# Patient Record
Sex: MALE | NOT HISPANIC OR LATINO | Employment: FULL TIME | URBAN - METROPOLITAN AREA
[De-identification: names, ages, dates, MRNs, and addresses within clinical notes are randomized per-mention and may not be internally consistent; named-entity substitution may affect disease eponyms.]

---

## 2022-08-03 ENCOUNTER — OFFICE VISIT (OUTPATIENT)
Dept: FAMILY MEDICINE | Facility: CLINIC | Age: 37
End: 2022-08-03
Payer: COMMERCIAL

## 2022-08-03 VITALS
OXYGEN SATURATION: 97 % | BODY MASS INDEX: 29.03 KG/M2 | HEART RATE: 94 BPM | HEIGHT: 71 IN | WEIGHT: 207.4 LBS | DIASTOLIC BLOOD PRESSURE: 84 MMHG | SYSTOLIC BLOOD PRESSURE: 138 MMHG | TEMPERATURE: 98.9 F

## 2022-08-03 DIAGNOSIS — F10.10 ALCOHOL ABUSE: ICD-10-CM

## 2022-08-03 DIAGNOSIS — F32.A DEPRESSION, UNSPECIFIED DEPRESSION TYPE: ICD-10-CM

## 2022-08-03 DIAGNOSIS — Z23 HIGH PRIORITY FOR 2019-NCOV VACCINE: ICD-10-CM

## 2022-08-03 DIAGNOSIS — Z86.718 PERSONAL HISTORY OF DVT (DEEP VEIN THROMBOSIS): ICD-10-CM

## 2022-08-03 DIAGNOSIS — F41.9 ANXIETY: ICD-10-CM

## 2022-08-03 DIAGNOSIS — F17.200 SMOKER: ICD-10-CM

## 2022-08-03 LAB
ALBUMIN SERPL-MCNC: 4.3 G/DL (ref 3.4–5)
ALP SERPL-CCNC: 57 U/L (ref 40–150)
ALT SERPL W P-5'-P-CCNC: 33 U/L (ref 0–70)
AST SERPL W P-5'-P-CCNC: 15 U/L (ref 0–45)
BILIRUB DIRECT SERPL-MCNC: 0.2 MG/DL (ref 0–0.2)
BILIRUB SERPL-MCNC: 1 MG/DL (ref 0.2–1.3)
PROT SERPL-MCNC: 7.5 G/DL (ref 6.8–8.8)

## 2022-08-03 PROCEDURE — 0064A COVID-19,PF,MODERNA (18+ YRS BOOSTER .25ML): CPT | Performed by: FAMILY MEDICINE

## 2022-08-03 PROCEDURE — 99203 OFFICE O/P NEW LOW 30 MIN: CPT | Mod: 25 | Performed by: FAMILY MEDICINE

## 2022-08-03 PROCEDURE — 91306 COVID-19,PF,MODERNA (18+ YRS BOOSTER .25ML): CPT | Performed by: FAMILY MEDICINE

## 2022-08-03 PROCEDURE — 36415 COLL VENOUS BLD VENIPUNCTURE: CPT | Performed by: FAMILY MEDICINE

## 2022-08-03 PROCEDURE — 80076 HEPATIC FUNCTION PANEL: CPT | Performed by: FAMILY MEDICINE

## 2022-08-03 ASSESSMENT — ANXIETY QUESTIONNAIRES
GAD7 TOTAL SCORE: 12
GAD7 TOTAL SCORE: 12
1. FEELING NERVOUS, ANXIOUS, OR ON EDGE: NEARLY EVERY DAY
IF YOU CHECKED OFF ANY PROBLEMS ON THIS QUESTIONNAIRE, HOW DIFFICULT HAVE THESE PROBLEMS MADE IT FOR YOU TO DO YOUR WORK, TAKE CARE OF THINGS AT HOME, OR GET ALONG WITH OTHER PEOPLE: SOMEWHAT DIFFICULT
6. BECOMING EASILY ANNOYED OR IRRITABLE: SEVERAL DAYS
2. NOT BEING ABLE TO STOP OR CONTROL WORRYING: MORE THAN HALF THE DAYS
5. BEING SO RESTLESS THAT IT IS HARD TO SIT STILL: SEVERAL DAYS
3. WORRYING TOO MUCH ABOUT DIFFERENT THINGS: SEVERAL DAYS
7. FEELING AFRAID AS IF SOMETHING AWFUL MIGHT HAPPEN: SEVERAL DAYS

## 2022-08-03 ASSESSMENT — PATIENT HEALTH QUESTIONNAIRE - PHQ9
5. POOR APPETITE OR OVEREATING: NEARLY EVERY DAY
SUM OF ALL RESPONSES TO PHQ QUESTIONS 1-9: 19

## 2022-08-03 ASSESSMENT — PAIN SCALES - GENERAL: PAINLEVEL: NO PAIN (0)

## 2022-08-03 NOTE — PROGRESS NOTES
"  Assessment & Plan     Depression, unspecified depression type      Anxiety      Alcohol abuse    - Adult Mental Health  Referral; Future  - Hepatic panel (Albumin, ALT, AST, Bili, Alk Phos, TP); Future  Discussed needs to completely off alcohol  Referral done to chem dep  He will follow up after he quits and we can start meds  Personal history of DVT (deep vein thrombosis)  In 2019 after surgery     Smoker  Discussed quitting    High priority for 2019-nCoV vaccine  Discussed   - COVID-19,PF,MODERNA (18+ Yrs BOOSTER .25mL)  Nicotine/Tobacco Cessation:  He reports that he has been smoking. He started smoking about 19 years ago. He has been smoking about 0.50 packs per day. He has never used smokeless tobacco.  Nicotine/Tobacco Cessation Plan:   Information offered: Patient not interested at this time      BMI:   Estimated body mass index is 28.81 kg/m  as calculated from the following:    Height as of this encounter: 1.807 m (5' 11.14\").    Weight as of this encounter: 94.1 kg (207 lb 6.4 oz).   Weight management plan: Exercise        Return in about 1 month (around 9/3/2022) for recheck/ sooner if worse or New symptoms.   Referral done  Follow up after he quits alcohol    Cristiane Sotelo MD  [facility] is a 37 year old, presenting for the following health issues:  No chief complaint on file.    HPI     Abnormal Mood Symptoms  Onset/Duration: unsure  Description: Started seeing a therapist in February or March of this year.   Depression (if yes, do PHQ-9): YES  Anxiety (if yes, do IRMA-7): YES  Accompanying Signs & Symptoms:  Still participating in activities that you used to enjoy: YES, only if invited   Fatigue: YES, but maybe related to other issue  Irritability: YES  Difficulty concentrating: No  Changes in appetite: YES  Problems with sleep: YES  Heart racing/beating fast: No  Abnormally elevated, expansive, or irritable mood: No  Persistently increased " "activity or energy: No  Thoughts of hurting yourself or others: No  History:  Recent stress or major life event: YES- wife with borderline personality disorder   Prior depression or anxiety: None  Family history of depression or anxiety: No  Alcohol/drug use: YES  Difficulty sleeping: YES  Precipitating or alleviating factors: None  Therapies tried and outcome: none and individual therapy  Significant other has Personality disorder  So having a hard time with this  Work is Good  Likes his work but Right now dos not feel like Going  Pt does drink a fair amount of alcohol equivalent to 5 drinks of whiskey daily  He says he gets into arguments with his girlfriend about this  He is ready to quit  No suicidal ideation or thoughts      Review of Systems   Rest of the ROS is Negative except see above and Problem list [stable]        Objective    /84   Pulse 94   Temp 98.9  F (37.2  C) (Temporal)   Ht 1.807 m (5' 11.14\")   Wt 94.1 kg (207 lb 6.4 oz)   SpO2 97%   BMI 28.81 kg/m    Body mass index is 28.81 kg/m .  Physical Exam   GENERAL: healthy, alert and no distress  PSYCH: anxious    Pending             .  ..  "

## 2022-08-03 NOTE — COMMUNITY RESOURCES LIST (ENGLISH)
08/03/2022   [facility] - Outpatient Clinics  N/A  For questions about this resource list or additional care needs, please contact your primary care clinic or care manager.  Phone: [phone]   Email: N/A   Address: [address] [place] [address]   Hours: N/A        Hotlines and Helplines       Hotline - Crisis help  1  [facility] - Crisis Helpline Distance: 2.29 miles      COVID-19 Status: Phone/Virtual   [address] [place] [address]  Language: Danish, Luxembourgish, English, Gujarati, Rosaline, Kiswahili, Danish, Mongolian, Urdu, Sinhala  Hours: Mon - Sun Open 24 Hours  Fees: Free   Phone: (273) 845-8520 Email: info@[email or web address].org Website: [email or web address]://www.[email or web address].[email or web address]/     2  [employer] [facility]. - 24-Hour Helpline Distance: 4.54 miles      COVID-19 Status: Regular Operations   [address] [place] [address]  Language: Danish, English, Hmong, Citizen of [place], Slovenian  Hours: Mon - Sun Open 24 Hours   Phone: (204) 677-9721 Email: brlcopyr4z@[employer] Website: http://[employer]          Mental Health       Individual counseling  [address] [place] [address] Distance: 0.22 miles      COVID-19 Status: Regular Operations, COVID-19 Status: Phone/Virtual   [address] [place] [address]  Language: English  Hours: Mon - Thu 7:00 AM - 6:00 PM , Fri 7:00 AM - 5:00 PM  Fees: Insurance, Self Pay   Phone: (264) 151-8917 Email: [email or web address].[email or web address] Website: [email or web address]://www.[email or web address]/[facility]/siuiiarv-shhskvm-[email or web address] Distance: 0.97 miles      COVID-19 Status: Unable to Verify   [address] [address]  Language: English  Hours: Mon - Fri 6:00 AM - 9:00 PM  Fees: Insurance, Self Pay, Sliding Fee   Phone: (234) 243-5712 Email: info@[employer] Website: http://www.[employer]     Mental health crisis care  [address] [place] [address]  Crisis Services Distance: 4.62 miles      COVID-19 Status: Regular Operations, COVID-19 Status: Phone/Virtual   [address] [place] [address]  Language: English  Hours: Mon - Sun Open 24 Hours  Fees: Insurance, Self Pay   Phone: (187) 674-7963 Website: [email or web address]     6  [facility] Acute Psychiatry Services Distance: 7.92 miles      COVID-19 Status: Regular Operations   [address] [place] [address]  Language: English  Hours: Mon - Sun Open 24 Hours  Fees: Insurance, Self Pay, Sliding Fee   Phone: (211) 483-9092 Website: https://www.[facility].org/specialty/psychiatry/acute-psychiatry-services/     Mental health support group  7  [facility] Distance: 1.78 miles      COVID-19 Status: Phone/Virtual   [address] [place] [address]  Language: English  Hours: Mon - Fri 9:00 AM - 3:00 PM  Fees: Free   Phone: (356) 964-6012 Website: http://www.[employer].org/     8  [facility] ([facility] Distance: 7.43 miles      COVID-19 Status: Regular Operations, COVID-19 Status: Phone/Virtual   [address] [place] [address]  Language: English, Pashto, Flo  Hours: Mon - Fri 9:00 AM - 5:00 PM  Fees: Free   Phone: (883) 130-5095 Email: letha@[employer] Website: https://www.[employer]).com/[employer].com.org/          Important Numbers & Websites       Emergency Services   911  City Services   311  Poison Control   (643) 101-9985  Suicide Prevention Lifeline   (430) 919-5073 (TALK)  Child Abuse Hotline   (424) 380-5527 (4-A-Child)  Sexual Assault Hotline   (290) 588-4559 (HOPE)  National Runaway Safeline   (839) 937-6414 (RUNAWAY)  All-Options Talkline   (840) 328-2323  Substance Abuse Referral   (688) 806-8896 (HELP)     Patient/Caregiver provided printed discharge information.

## 2022-08-03 NOTE — LETTER
August 4, 2022    Henry Garcia  [address]  [place] [address]           Dear ,    We are writing to inform you of your test results.    Your liver test is normal.       Resulted Orders   Hepatic panel (Albumin, ALT, AST, Bili, Alk Phos, TP)   Result Value Ref Range    Bilirubin Total 1.0 0.2 - 1.3 mg/dL    Bilirubin Direct 0.2 0.0 - 0.2 mg/dL    Protein Total 7.5 6.8 - 8.8 g/dL    Albumin 4.3 3.4 - 5.0 g/dL    Alkaline Phosphatase 57 40 - 150 U/L    AST 15 0 - 45 U/L    ALT 33 0 - 70 U/L       If you have any questions or concerns, please call the clinic at the number listed above.       Sincerely,      Cristiane Sotelo MD

## 2022-08-07 ENCOUNTER — MYC MEDICAL ADVICE (OUTPATIENT)
Dept: FAMILY MEDICINE | Facility: CLINIC | Age: 37
End: 2022-08-07

## 2022-08-24 ENCOUNTER — OFFICE VISIT (OUTPATIENT)
Dept: FAMILY MEDICINE | Facility: CLINIC | Age: 37
End: 2022-08-24
Payer: COMMERCIAL

## 2022-08-24 VITALS
WEIGHT: 210 LBS | SYSTOLIC BLOOD PRESSURE: 122 MMHG | BODY MASS INDEX: 29.17 KG/M2 | OXYGEN SATURATION: 100 % | TEMPERATURE: 97.6 F | HEART RATE: 60 BPM | DIASTOLIC BLOOD PRESSURE: 92 MMHG

## 2022-08-24 DIAGNOSIS — F32.A DEPRESSION, UNSPECIFIED DEPRESSION TYPE: Primary | ICD-10-CM

## 2022-08-24 DIAGNOSIS — F41.9 ANXIETY: ICD-10-CM

## 2022-08-24 DIAGNOSIS — F10.10 ALCOHOL ABUSE: ICD-10-CM

## 2022-08-24 PROCEDURE — 99214 OFFICE O/P EST MOD 30 MIN: CPT | Performed by: PHYSICIAN ASSISTANT

## 2022-08-24 RX ORDER — CITALOPRAM HYDROBROMIDE 20 MG/1
20 TABLET ORAL DAILY
Qty: 90 TABLET | Refills: 0 | Status: SHIPPED | OUTPATIENT
Start: 2022-08-24 | End: 2022-11-21

## 2022-08-24 ASSESSMENT — ANXIETY QUESTIONNAIRES
4. TROUBLE RELAXING: NEARLY EVERY DAY
3. WORRYING TOO MUCH ABOUT DIFFERENT THINGS: MORE THAN HALF THE DAYS
8. IF YOU CHECKED OFF ANY PROBLEMS, HOW DIFFICULT HAVE THESE MADE IT FOR YOU TO DO YOUR WORK, TAKE CARE OF THINGS AT HOME, OR GET ALONG WITH OTHER PEOPLE?: SOMEWHAT DIFFICULT
5. BEING SO RESTLESS THAT IT IS HARD TO SIT STILL: SEVERAL DAYS
6. BECOMING EASILY ANNOYED OR IRRITABLE: MORE THAN HALF THE DAYS
IF YOU CHECKED OFF ANY PROBLEMS ON THIS QUESTIONNAIRE, HOW DIFFICULT HAVE THESE PROBLEMS MADE IT FOR YOU TO DO YOUR WORK, TAKE CARE OF THINGS AT HOME, OR GET ALONG WITH OTHER PEOPLE: SOMEWHAT DIFFICULT
7. FEELING AFRAID AS IF SOMETHING AWFUL MIGHT HAPPEN: NOT AT ALL
GAD7 TOTAL SCORE: 11
1. FEELING NERVOUS, ANXIOUS, OR ON EDGE: SEVERAL DAYS
2. NOT BEING ABLE TO STOP OR CONTROL WORRYING: MORE THAN HALF THE DAYS
GAD7 TOTAL SCORE: 11
7. FEELING AFRAID AS IF SOMETHING AWFUL MIGHT HAPPEN: NOT AT ALL
GAD7 TOTAL SCORE: 11

## 2022-08-24 ASSESSMENT — PATIENT HEALTH QUESTIONNAIRE - PHQ9
SUM OF ALL RESPONSES TO PHQ QUESTIONS 1-9: 19
SUM OF ALL RESPONSES TO PHQ QUESTIONS 1-9: 19
10. IF YOU CHECKED OFF ANY PROBLEMS, HOW DIFFICULT HAVE THESE PROBLEMS MADE IT FOR YOU TO DO YOUR WORK, TAKE CARE OF THINGS AT HOME, OR GET ALONG WITH OTHER PEOPLE: VERY DIFFICULT

## 2022-08-24 ASSESSMENT — ENCOUNTER SYMPTOMS: NERVOUS/ANXIOUS: 1

## 2022-08-24 NOTE — LETTER
My Depression Action Plan  Name: Henry Garcia   Date of Birth 1985  Date: 8/24/2022    My doctor: Cristiane Sotelo   My clinic: [address]  [place] [address]  [phone]          GREEN    ZONE   Good Control    What it looks like:     Things are going generally well. You have normal ups and downs. You may even feel depressed from time to time, but bad moods usually last less than a day.   What you need to do:  1. Continue to care for yourself (see self care plan)  2. Check your depression survival kit and update it as needed  3. Follow your physician s recommendations including any medication.  4. Do not stop taking medication unless you consult with your physician first.           YELLOW         ZONE Getting Worse    What it looks like:     Depression is starting to interfere with your life.     It may be hard to get out of bed; you may be starting to isolate yourself from others.    Symptoms of depression are starting to last most all day and this has happened for several days.     You may have suicidal thoughts but they are not constant.   What you need to do:     1. Call your care team. Your response to treatment will improve if you keep your care team informed of your progress. Yellow periods are signs an adjustment may need to be made.     2. Continue your self-care.  Just get dressed and ready for the day.  Don't give yourself time to talk yourself out of it.    3. Talk to someone in your support network.    4. Open up your Depression Self-Care Plan/Wellness Kit.           RED    ZONE Medical Alert - Get Help    What it looks like:     Depression is seriously interfering with your life.     You may experience these or other symptoms: You can t get out of bed most days, can t work or engage in other necessary activities, you have trouble taking care of basic hygiene, or basic responsibilities, thoughts of suicide or death that will not go  away, self-injurious behavior.     What you need to do:  1. Call your care team and request a same-day appointment. If they are not available (weekends or after hours) call your local crisis line, emergency room or 911.          Depression Self-Care Plan / Wellness Kit    Many people find that medication and therapy are helpful treatments for managing depression. In addition, making small changes to your everyday life can help to boost your mood and improve your wellbeing. Below are some tips for you to consider. Be sure to talk with your medical provider and/or behavioral health consultant if your symptoms are worsening or not improving.     Sleep   Sleep hygiene  means all of the habits that support good, restful sleep. It includes maintaining a consistent bedtime and wake time, using your bedroom only for sleeping or sex, and keeping the bedroom dark and free of distractions like a computer, smartphone, or television.     Develop a Healthy Routine  Maintain good hygiene. Get out of bed in the morning, make your bed, brush your teeth, take a shower, and get dressed. Don t spend too much time viewing media that makes you feel stressed. Find time to relax each day.    Exercise  Get some form of exercise every day. This will help reduce pain and release endorphins, the  feel good  chemicals in your brain. It can be as simple as just going for a walk or doing some gardening, anything that will get you moving.      Diet  Strive to eat healthy foods, including fruits and vegetables. Drink plenty of water. Avoid excessive sugar, caffeine, alcohol, and other mood-altering substances.     Stay Connected with Others  Stay in touch with friends and family members.    Manage Your Mood  Try deep breathing, massage therapy, biofeedback, or meditation. Take part in fun activities when you can. Try to find something to smile about each day.     Psychotherapy  Be open to working with a therapist if your provider recommends it.      Medication  Be sure to take your medication as prescribed. Most anti-depressants need to be taken every day. It usually takes several weeks for medications to work. Not all medicines work for all people. It is important to follow-up with your provider to make sure you have a treatment plan that is working for you. Do not stop your medication abruptly without first discussing it with your provider.    Crisis Resources   These hotlines are for both adults and children. They and are open 24 hours a day, 7 days a week unless noted otherwise.      National Suicide Prevention Lifeline   988 or 9-693-309-WOFG (8724)      Crisis Text Line    www.crisistextline.org  Text HOME to 449746 from anywhere in the [place], anytime, about any type of crisis. A live, trained crisis counselor will receive the text and respond quickly.      Jamie Lifeline for LGBTQ Youth  A national crisis intervention and suicide lifeline for LGBTQ youth under 25. Provides a safe place to talk without judgement. Call 1-343.787.8259; text START to 489559 or visit www.thetrevorproject.org to talk to a trained counselor.      For [facility] crisis numbers, visit the [facility] website at:  https://[place].gov/dhs/people-we-serve/adults/health-care/mental-health/resources/crisis-contacts.jsp

## 2022-08-24 NOTE — PROGRESS NOTES
Assessment & Plan     Depression, unspecified depression type  Will start celexa, increased alcohol use is a coping mechanism for depression. Needs treatment. Discussed cutting back on alcohol.Cpntinue with counseling. Follow up in 4-6 weeks. Disucssed potential side effects.   - citalopram (CELEXA) 20 MG tablet; Take 1 tablet (20 mg) by mouth daily    Anxiety  - citalopram (CELEXA) 20 MG tablet; Take 1 tablet (20 mg) by mouth daily    Alcohol abuse               Depression Screening Follow Up    PHQ 8/24/2022   PHQ-9 Total Score 19   Q9: Thoughts of better off dead/self-harm past 2 weeks Not at all         Follow Up Actions Taken  Depression Action Plan reviewed with patient.         Return in about 5 weeks (around 9/28/2022) for Video/Virtual Visit, Mood check.    Sima Santoyo PA-C  [facility] [place] Figueroa is a 37 year old, presenting for the following health issues:  Depression, Anxiety, and Health Maintenance      Anxiety    History of Present Illness       Mental Health Follow-up:  Patient presents to follow-up on Depression & Anxiety.Patient's depression since last visit has been:  No change  The patient is not having other symptoms associated with depression.  Patient's anxiety since last visit has been:  No change  The patient is not having other symptoms associated with anxiety.  Any significant life events: No  Patient is not feeling anxious or having panic attacks.  Patient has no concerns about alcohol or drug use.     Today's PHQ-9         PHQ-9 Total Score: 19    PHQ-9 Q9 Thoughts of better off dead/self-harm past 2 weeks :   Not at all    How difficult have these problems made it for you to do your work, take care of things at home, or get along with other people: Very difficult  Today's IRMA-7 Score: 11     Struggled with mental health his whole life.   Seeing a therapist.     No suicidal thoughts.   Sleeps 4-5 hours per night.   Snores- doesn't get tired until  late and has to get up early.   Decreased interest in things he likes over the last year.   Work has been fine. New job over 1 year ago.     Whiskey- after dinner. 1-2 heavy handed.         Review of Systems   Psychiatric/Behavioral: The patient is nervous/anxious.             Objective    BP (!) 138/91 (BP Location: Right arm, Patient Position: Chair, Cuff Size: Adult Regular)   Pulse 60   Temp 97.6  F (36.4  C) (Oral)   Wt 95.3 kg (210 lb)   SpO2 100%   BMI 29.17 kg/m    Body mass index is 29.17 kg/m .  Physical Exam   GENERAL: healthy, alert and no distress  PSYCH: mentation appears normal, affect normal/bright                    .  ..

## 2022-08-28 ENCOUNTER — HEALTH MAINTENANCE LETTER (OUTPATIENT)
Age: 37
End: 2022-08-28

## 2022-09-22 ENCOUNTER — VIRTUAL VISIT (OUTPATIENT)
Dept: FAMILY MEDICINE | Facility: CLINIC | Age: 37
End: 2022-09-22
Payer: COMMERCIAL

## 2022-09-22 DIAGNOSIS — F10.10 ALCOHOL ABUSE: Primary | ICD-10-CM

## 2022-09-22 DIAGNOSIS — F32.4 MAJOR DEPRESSIVE DISORDER WITH SINGLE EPISODE, IN PARTIAL REMISSION (H): ICD-10-CM

## 2022-09-22 DIAGNOSIS — F41.9 ANXIETY: ICD-10-CM

## 2022-09-22 PROCEDURE — 99213 OFFICE O/P EST LOW 20 MIN: CPT | Mod: TEL | Performed by: PHYSICIAN ASSISTANT

## 2022-09-22 ASSESSMENT — PATIENT HEALTH QUESTIONNAIRE - PHQ9
SUM OF ALL RESPONSES TO PHQ QUESTIONS 1-9: 8
5. POOR APPETITE OR OVEREATING: NOT AT ALL

## 2022-09-22 ASSESSMENT — ANXIETY QUESTIONNAIRES
6. BECOMING EASILY ANNOYED OR IRRITABLE: MORE THAN HALF THE DAYS
5. BEING SO RESTLESS THAT IT IS HARD TO SIT STILL: NOT AT ALL
1. FEELING NERVOUS, ANXIOUS, OR ON EDGE: SEVERAL DAYS
GAD7 TOTAL SCORE: 4
IF YOU CHECKED OFF ANY PROBLEMS ON THIS QUESTIONNAIRE, HOW DIFFICULT HAVE THESE PROBLEMS MADE IT FOR YOU TO DO YOUR WORK, TAKE CARE OF THINGS AT HOME, OR GET ALONG WITH OTHER PEOPLE: SOMEWHAT DIFFICULT
2. NOT BEING ABLE TO STOP OR CONTROL WORRYING: SEVERAL DAYS
3. WORRYING TOO MUCH ABOUT DIFFERENT THINGS: NOT AT ALL
7. FEELING AFRAID AS IF SOMETHING AWFUL MIGHT HAPPEN: NOT AT ALL
GAD7 TOTAL SCORE: 4

## 2022-09-22 NOTE — PROGRESS NOTES
Henry is a 37 year old who is being evaluated via a billable telephone visit.      What phone number would you like to be contacted at? [phone]  How would you like to obtain your AVS? MyChart    Assessment & Plan     Alcohol abuse  Cutting back significantly.     Major depressive disorder with single episode, in partial remission (H)  Anxiety  Feeling better, PHQ-9 score improved. continue with therapist.                    No follow-ups on file.    PHILL Kendall [facility]    Cheyanne Figueroa is a 37 year old, presenting for the following health issues:  Depression, Anxiety, and Health Maintenance      HPI     Depression and Anxiety Follow-Up    How are you doing with your depression since your last visit? Improved     How are you doing with your anxiety since your last visit?  Improved     Are you having other symptoms that might be associated with depression or anxiety? No    Have you had a significant life event? OTHER: wife leaving     Do you have any concerns with your use of alcohol or other drugs? No    Social History     Tobacco Use     Smoking status: Current Every Day Smoker     Packs/day: 0.50     Start date: 6/15/2003     Smokeless tobacco: Never Used   Substance Use Topics     Alcohol use: Yes     Alcohol/week: 7.0 standard drinks     Types: 7 Shots of liquor per week     Comment: 7-10     Drug use: Never     PHQ 8/3/2022 8/24/2022   PHQ-9 Total Score 19 19   Q9: Thoughts of better off dead/self-harm past 2 weeks Not at all Not at all     IRMA-7 SCORE 8/3/2022 8/24/2022   Total Score - 11 (moderate anxiety)   Total Score 12 11         Suicide Assessment Five-step Evaluation and Treatment (SAFE-T)      How many servings of fruits and vegetables do you eat daily?  2-3    On average, how many sweetened beverages do you drink each day (Examples: soda, juice, sweet tea, etc.  Do NOT count diet or artificially sweetened beverages)?   1    How many days per week do you  exercise enough to make your heart beat faster? 4    How many minutes a day do you exercise enough to make your heart beat faster? 30 - 60  How many days per week do you miss taking your medication? 1    What makes it hard for you to take your medications?  remembering to take    Feeling better. Focus more, mood is better.   No depressive slumps. No longer thinking the worst.   No suicidal thoughts.   Alcohol intake, has cut it down by about half- now 1 per night.   No side effects.   Talked about it with his therapist.     Review of Systems   Constitutional, HEENT, cardiovascular, pulmonary, gi and gu systems are negative, except as otherwise noted.      Objective           Vitals:  No vitals were obtained today due to virtual visit.    Physical Exam   healthy, alert and no distress  PSYCH: Alert and oriented times 3; coherent speech, normal   rate and volume, able to articulate logical thoughts, able   to abstract reason, no tangential thoughts, no hallucinations   or delusions  His affect is normal  RESP: No cough, no audible wheezing, able to talk in full sentences  Remainder of exam unable to be completed due to telephone visits                Phone call duration: 7 minutes

## 2022-11-19 DIAGNOSIS — F41.9 ANXIETY: ICD-10-CM

## 2022-11-19 DIAGNOSIS — F32.A DEPRESSION, UNSPECIFIED DEPRESSION TYPE: ICD-10-CM

## 2022-11-21 RX ORDER — CITALOPRAM HYDROBROMIDE 20 MG/1
TABLET ORAL
Qty: 90 TABLET | Refills: 0 | Status: SHIPPED | OUTPATIENT
Start: 2022-11-21 | End: 2022-12-27

## 2022-12-27 ENCOUNTER — VIRTUAL VISIT (OUTPATIENT)
Dept: FAMILY MEDICINE | Facility: CLINIC | Age: 37
End: 2022-12-27
Payer: COMMERCIAL

## 2022-12-27 DIAGNOSIS — F41.9 ANXIETY: ICD-10-CM

## 2022-12-27 DIAGNOSIS — F32.A DEPRESSION, UNSPECIFIED DEPRESSION TYPE: ICD-10-CM

## 2022-12-27 PROCEDURE — 99214 OFFICE O/P EST MOD 30 MIN: CPT | Mod: GT | Performed by: NURSE PRACTITIONER

## 2022-12-27 PROCEDURE — 96127 BRIEF EMOTIONAL/BEHAV ASSMT: CPT | Mod: GT | Performed by: NURSE PRACTITIONER

## 2022-12-27 RX ORDER — CHOLECALCIFEROL (VITAMIN D3) 50 MCG
1 TABLET ORAL DAILY
Qty: 90 TABLET | Refills: 1 | Status: SHIPPED | OUTPATIENT
Start: 2022-12-27

## 2022-12-27 RX ORDER — CITALOPRAM HYDROBROMIDE 20 MG/1
20 TABLET ORAL DAILY
Qty: 90 TABLET | Refills: 1 | Status: SHIPPED | OUTPATIENT
Start: 2022-12-27 | End: 2023-05-11

## 2022-12-27 ASSESSMENT — ANXIETY QUESTIONNAIRES
GAD7 TOTAL SCORE: 4
5. BEING SO RESTLESS THAT IT IS HARD TO SIT STILL: NOT AT ALL
6. BECOMING EASILY ANNOYED OR IRRITABLE: SEVERAL DAYS
7. FEELING AFRAID AS IF SOMETHING AWFUL MIGHT HAPPEN: NOT AT ALL
3. WORRYING TOO MUCH ABOUT DIFFERENT THINGS: SEVERAL DAYS
2. NOT BEING ABLE TO STOP OR CONTROL WORRYING: SEVERAL DAYS
1. FEELING NERVOUS, ANXIOUS, OR ON EDGE: NOT AT ALL
GAD7 TOTAL SCORE: 4
8. IF YOU CHECKED OFF ANY PROBLEMS, HOW DIFFICULT HAVE THESE MADE IT FOR YOU TO DO YOUR WORK, TAKE CARE OF THINGS AT HOME, OR GET ALONG WITH OTHER PEOPLE?: NOT DIFFICULT AT ALL
GAD7 TOTAL SCORE: 4
4. TROUBLE RELAXING: SEVERAL DAYS
7. FEELING AFRAID AS IF SOMETHING AWFUL MIGHT HAPPEN: NOT AT ALL
IF YOU CHECKED OFF ANY PROBLEMS ON THIS QUESTIONNAIRE, HOW DIFFICULT HAVE THESE PROBLEMS MADE IT FOR YOU TO DO YOUR WORK, TAKE CARE OF THINGS AT HOME, OR GET ALONG WITH OTHER PEOPLE: NOT DIFFICULT AT ALL

## 2022-12-27 ASSESSMENT — PATIENT HEALTH QUESTIONNAIRE - PHQ9
SUM OF ALL RESPONSES TO PHQ QUESTIONS 1-9: 3
SUM OF ALL RESPONSES TO PHQ QUESTIONS 1-9: 3
10. IF YOU CHECKED OFF ANY PROBLEMS, HOW DIFFICULT HAVE THESE PROBLEMS MADE IT FOR YOU TO DO YOUR WORK, TAKE CARE OF THINGS AT HOME, OR GET ALONG WITH OTHER PEOPLE: SOMEWHAT DIFFICULT

## 2022-12-27 NOTE — PROGRESS NOTES
Henry is a 37 year old who is being evaluated via a billable telephone visit.      What phone number would you like to be contacted at? [phone]   How would you like to obtain your AVS? Slava  Distant Location (provider location):  Off-site    Assessment & Plan     Depression, unspecified depression type  Feels mood has slightly decreased. Has SAD, we will slightly increase celexa to 30 mg daily, he will start vit D. Information on light therapy given. Follow up with provider in 4 months, but sooner if he feels this increase in medication is not helping  - citalopram (CELEXA) 20 MG tablet; Take 1 tablet (20 mg) by mouth daily Take 30 mg (1.5 tab) daily  - vitamin D3 (CHOLECALCIFEROL) 50 mcg (2000 units) tablet; Take 1 tablet (50 mcg) by mouth daily    Anxiety  As above  - citalopram (CELEXA) 20 MG tablet; Take 1 tablet (20 mg) by mouth daily Take 30 mg (1.5 tab) daily  - vitamin D3 (CHOLECALCIFEROL) 50 mcg (2000 units) tablet; Take 1 tablet (50 mcg) by mouth daily        Return in about 4 months (around 4/27/2023) for Medication check, Phone or Video visit okay.    DEMETRIA Eden, NP-C  [facility]   Henry is a 37 year old accompanied by his s elf, presenting for the following health issues:  Recheck Medication      History of Present Illness       Mental Health Follow-up:  Patient presents to follow-up on Depression & Anxiety.Patient's depression since last visit has been:  Worse  The patient is not having other symptoms associated with depression.  Patient's anxiety since last visit has been:  Better  The patient is not having other symptoms associated with anxiety.  Any significant life events: No  Patient is not feeling anxious or having panic attacks.  Patient has no concerns about alcohol or drug use.    He eats 2-3 servings of fruits and vegetables daily.He consumes 1 sweetened beverage(s) daily.He exercises with enough effort to increase his heart rate 9 or  less minutes per day.  He exercises with enough effort to increase his heart rate 3 or less days per week.   He is taking medications regularly.    Today's PHQ-9         PHQ-9 Total Score: 3    PHQ-9 Q9 Thoughts of better off dead/self-harm past 2 weeks :   Not at all    How difficult have these problems made it for you to do your work, take care of things at home, or get along with other people: Somewhat difficult  Today's IRMA-7 Score: 4         celexa 20 mg going okay.  Is on his feet for 8 hr a day, that is his exercise for the day. Is not taking vit D.           Review of Systems   Constitutional, HEENT, cardiovascular, pulmonary, gi and gu systems are negative, except as otherwise noted.      Objective    Vitals - Patient Reported  Pain Score: No Pain (0)        Physical Exam   healthy, alert and no distress  PSYCH: Alert and oriented times 3; coherent speech, normal   rate and volume, able to articulate logical thoughts, able   to abstract reason, no tangential thoughts, no hallucinations   or delusions  His affect is normal  RESP: No cough, no audible wheezing, able to talk in full sentences  Remainder of exam unable to be completed due to telephone visits    Did not review labs today            Phone call duration: 5 minutes

## 2022-12-27 NOTE — PATIENT INSTRUCTIONS
Start bright light therapy using fluorescent bulbs with 10,000 lux, 16-31 inches from face with eyes open (do not stare at light) for 30 minutes a day. Do at the same time in the morning between/around 7-8 am for depression/anxiety.  Get light box that is designed to protect the eyes with features that filters out UV rays. Have baseline opthamology exam and annually thereafter while doing bright light therapy. Insurance does not cover this.     Also recommended to get outside even on cloudy days and walk for 30 minutes as this is also shown to help with anxiety and depression as the sunlight, even on overcast days can help improve mood.

## 2023-01-08 ENCOUNTER — HEALTH MAINTENANCE LETTER (OUTPATIENT)
Age: 38
End: 2023-01-08

## 2023-05-10 ENCOUNTER — TELEPHONE (OUTPATIENT)
Dept: FAMILY MEDICINE | Facility: CLINIC | Age: 38
End: 2023-05-10
Payer: COMMERCIAL

## 2023-05-10 DIAGNOSIS — F32.A DEPRESSION, UNSPECIFIED DEPRESSION TYPE: ICD-10-CM

## 2023-05-10 DIAGNOSIS — F41.9 ANXIETY: ICD-10-CM

## 2023-05-10 NOTE — TELEPHONE ENCOUNTER
Medication Question or Refill        What medication are you calling about (include dose and sig)?: citalopram (CELEXA) 20 MG tablet    Controlled Substance Agreement on file:   CSA -- Patient Level:    CSA: None found at the patient level.       Who prescribed the medication?: Veronica Verdin      Do you have any questions or concerns? Yes

## 2023-05-11 RX ORDER — CITALOPRAM HYDROBROMIDE 20 MG/1
30 TABLET ORAL DAILY
Qty: 45 TABLET | Refills: 0 | Status: SHIPPED | OUTPATIENT
Start: 2023-05-11 | End: 2023-06-16

## 2023-06-15 DIAGNOSIS — F41.9 ANXIETY: ICD-10-CM

## 2023-06-15 DIAGNOSIS — F32.A DEPRESSION, UNSPECIFIED DEPRESSION TYPE: ICD-10-CM

## 2023-06-15 NOTE — LETTER
July 5, 2023      Henry Garcia  [address] [address]      Hi Dr. Deepak Figueroa refilled your citalopram for 45 pills only. Please call [phone] to schedule an appointment to get more refills. We have been unable to reach you by phone.  Have a great day.    Siobhan Quintanilla ma              Sincerely,        Rashmi Snider MD

## 2023-06-16 RX ORDER — CITALOPRAM HYDROBROMIDE 20 MG/1
TABLET ORAL
Qty: 45 TABLET | Refills: 0 | Status: SHIPPED | OUTPATIENT
Start: 2023-06-16 | End: 2023-07-24

## 2023-06-19 ENCOUNTER — MYC MEDICAL ADVICE (OUTPATIENT)
Dept: FAMILY MEDICINE | Facility: CLINIC | Age: 38
End: 2023-06-19
Payer: COMMERCIAL

## 2023-06-19 ASSESSMENT — PATIENT HEALTH QUESTIONNAIRE - PHQ9
SUM OF ALL RESPONSES TO PHQ QUESTIONS 1-9: 4
10. IF YOU CHECKED OFF ANY PROBLEMS, HOW DIFFICULT HAVE THESE PROBLEMS MADE IT FOR YOU TO DO YOUR WORK, TAKE CARE OF THINGS AT HOME, OR GET ALONG WITH OTHER PEOPLE: NOT DIFFICULT AT ALL
SUM OF ALL RESPONSES TO PHQ QUESTIONS 1-9: 4

## 2023-06-19 NOTE — TELEPHONE ENCOUNTER
Sent patient mychart message postponing one week       Destinee HERNANDES CMA ([facility])

## 2023-06-26 NOTE — TELEPHONE ENCOUNTER
Called patient and left message to call back to schedule appointment.       Destinee HERNANDES CMA ([facility])

## 2023-07-22 DIAGNOSIS — F32.A DEPRESSION, UNSPECIFIED DEPRESSION TYPE: ICD-10-CM

## 2023-07-22 DIAGNOSIS — F41.9 ANXIETY: ICD-10-CM

## 2023-07-24 RX ORDER — CITALOPRAM HYDROBROMIDE 20 MG/1
TABLET ORAL
Qty: 45 TABLET | Refills: 0 | Status: SHIPPED | OUTPATIENT
Start: 2023-07-24 | End: 2023-08-14

## 2023-08-14 ENCOUNTER — MYC REFILL (OUTPATIENT)
Dept: FAMILY MEDICINE | Facility: CLINIC | Age: 38
End: 2023-08-14
Payer: COMMERCIAL

## 2023-08-14 DIAGNOSIS — F41.9 ANXIETY: ICD-10-CM

## 2023-08-14 DIAGNOSIS — F32.A DEPRESSION, UNSPECIFIED DEPRESSION TYPE: ICD-10-CM

## 2023-08-15 RX ORDER — CITALOPRAM HYDROBROMIDE 20 MG/1
30 TABLET ORAL DAILY
Qty: 45 TABLET | Refills: 0 | Status: SHIPPED | OUTPATIENT
Start: 2023-08-15 | End: 2023-11-21

## 2023-08-21 NOTE — TELEPHONE ENCOUNTER
"Patient \"READ\" mychart on 08/19/2023.    Me   to Henry CARPENTER      8/15/23 11:38 AM  Dear Henry,         Your provider has sent a  meryl refill of citalopram (CELEXA) 20 MG tablet . You are due for an appointment for further refills.   Please contact the clinic or use [facility]ants to schedule an appointment for further refills.      Sincerely,      [facility]/JAYDEN        Last read by Henry Garcia at 10:45 PM on 8/19/202.    An Lauren, JASKARAN      "

## 2023-09-24 ENCOUNTER — HEALTH MAINTENANCE LETTER (OUTPATIENT)
Age: 38
End: 2023-09-24

## 2023-11-20 ASSESSMENT — ANXIETY QUESTIONNAIRES
GAD7 TOTAL SCORE: 3
2. NOT BEING ABLE TO STOP OR CONTROL WORRYING: NOT AT ALL
7. FEELING AFRAID AS IF SOMETHING AWFUL MIGHT HAPPEN: NOT AT ALL
3. WORRYING TOO MUCH ABOUT DIFFERENT THINGS: NOT AT ALL
4. TROUBLE RELAXING: NOT AT ALL
6. BECOMING EASILY ANNOYED OR IRRITABLE: SEVERAL DAYS
GAD7 TOTAL SCORE: 3
5. BEING SO RESTLESS THAT IT IS HARD TO SIT STILL: SEVERAL DAYS
IF YOU CHECKED OFF ANY PROBLEMS ON THIS QUESTIONNAIRE, HOW DIFFICULT HAVE THESE PROBLEMS MADE IT FOR YOU TO DO YOUR WORK, TAKE CARE OF THINGS AT HOME, OR GET ALONG WITH OTHER PEOPLE: NOT DIFFICULT AT ALL
1. FEELING NERVOUS, ANXIOUS, OR ON EDGE: SEVERAL DAYS

## 2023-11-20 ASSESSMENT — PATIENT HEALTH QUESTIONNAIRE - PHQ9
SUM OF ALL RESPONSES TO PHQ QUESTIONS 1-9: 2
SUM OF ALL RESPONSES TO PHQ QUESTIONS 1-9: 2
10. IF YOU CHECKED OFF ANY PROBLEMS, HOW DIFFICULT HAVE THESE PROBLEMS MADE IT FOR YOU TO DO YOUR WORK, TAKE CARE OF THINGS AT HOME, OR GET ALONG WITH OTHER PEOPLE: NOT DIFFICULT AT ALL

## 2023-11-21 ENCOUNTER — VIRTUAL VISIT (OUTPATIENT)
Dept: FAMILY MEDICINE | Facility: CLINIC | Age: 38
End: 2023-11-21
Payer: COMMERCIAL

## 2023-11-21 DIAGNOSIS — F41.9 ANXIETY: ICD-10-CM

## 2023-11-21 DIAGNOSIS — R03.0 ELEVATED BP WITHOUT DIAGNOSIS OF HYPERTENSION: ICD-10-CM

## 2023-11-21 DIAGNOSIS — F32.A DEPRESSION, UNSPECIFIED DEPRESSION TYPE: Primary | ICD-10-CM

## 2023-11-21 PROCEDURE — 99213 OFFICE O/P EST LOW 20 MIN: CPT | Mod: VID | Performed by: FAMILY MEDICINE

## 2023-11-21 RX ORDER — CITALOPRAM HYDROBROMIDE 20 MG/1
30 TABLET ORAL DAILY
Qty: 135 TABLET | Refills: 0 | Status: SHIPPED | OUTPATIENT
Start: 2023-11-21

## 2023-11-21 NOTE — PROGRESS NOTES
Henry is a 38 year old who is being evaluated via a billable video visit.      How would you like to obtain your AVS? MyChart  If the video visit is dropped, the invitation should be resent by: Text to cell phone: [phone]  Will anyone else be joining your video visit? No      Assessment & Plan     Depression, unspecified depression type  Medication helping with the symptoms, PHQ-9 score is 2.  Will refill medication and follow-up in 3 months  - citalopram (CELEXA) 20 MG tablet; Take 1.5 tablets (30 mg) by mouth daily    Anxiety  Medication helping with anxiety, IRMA-7 score is 3  - citalopram (CELEXA) 20 MG tablet; Take 1.5 tablets (30 mg) by mouth daily    Elevated BP without diagnosis of hypertension    -Had an elevated blood pressure at last office visit, he will check some readings at home and give an update; if they are persistently high will return for recheck    See Patient Instructions    Fredrick Malin MD  [facility]   Henry is a 38 year old, presenting for the following health issues:  Recheck Medication    Depression  He taking Celexa 20 mg   No side effects so far;  Did see a therapy    Elevated BP:   Had an elevated reading in clinic; will check some readings at home and update us      11/21/2023     4:26 PM   Additional Questions   Roomed by Cesilia OMER   Accompanied by self- video visit     Depression and Anxiety Follow-Up  How are you doing with your depression since your last visit? Feeling better  How are you doing with your anxiety since your last visit?  Improved .  Are you having other symptoms that might be associated with depression or anxiety? No  Have you had a significant life event? No   Do you have any concerns with your use of alcohol or other drugs? No    Social History     Tobacco Use    Smoking status: Every Day     Packs/day: .5     Types: Cigarettes     Start date: 6/15/2003    Smokeless tobacco: Never   Vaping Use    Vaping Use: Never  used   Substance Use Topics    Alcohol use: Yes     Alcohol/week: 7.0 standard drinks of alcohol     Types: 7 Shots of liquor per week     Comment: 7-10    Drug use: Never         12/27/2022     3:23 PM 6/19/2023     8:31 AM 11/20/2023     8:13 PM   PHQ   PHQ-9 Total Score 3 4 2   Q9: Thoughts of better off dead/self-harm past 2 weeks Not at all Not at all Not at all         9/22/2022    12:21 PM 12/27/2022     3:23 PM 11/20/2023     8:17 PM   IRMA-7 SCORE   Total Score  4 (minimal anxiety) 3 (minimal anxiety)   Total Score 4 4 3         11/20/2023     8:13 PM   Last PHQ-9   1.  Little interest or pleasure in doing things 0   2.  Feeling down, depressed, or hopeless 0   3.  Trouble falling or staying asleep, or sleeping too much 0   4.  Feeling tired or having little energy 1   5.  Poor appetite or overeating 0   6.  Feeling bad about yourself 0   7.  Trouble concentrating 0   8.  Moving slowly or restless 1   Q9: Thoughts of better off dead/self-harm past 2 weeks 0   PHQ-9 Total Score 2         11/20/2023     8:17 PM   IRMA-7    1. Feeling nervous, anxious, or on edge 1   2. Not being able to stop or control worrying 0   3. Worrying too much about different things 0   4. Trouble relaxing 0   5. Being so restless that it is hard to sit still 1   6. Becoming easily annoyed or irritable 1   7. Feeling afraid, as if something awful might happen 0   IRMA-7 Total Score 3   If you checked any problems, how difficult have they made it for you to do your work, take care of things at home, or get along with other people? Not difficult at all         History of Present Illness       Mental Health Follow-up:  Patient presents to follow-up on Depression & Anxiety.Patient's depression since last visit has been:  Good  The patient is not having other symptoms associated with depression.  Patient's anxiety since last visit has been:  Good  The patient is not having other symptoms associated with anxiety.  Any significant life events:  relationship concerns  Patient is not feeling anxious or having panic attacks.  Patient has no concerns about alcohol or drug use.    He eats 0-1 servings of fruits and vegetables daily.He consumes 1 sweetened beverage(s) daily.He exercises with enough effort to increase his heart rate 10 to 19 minutes per day.  He exercises with enough effort to increase his heart rate 3 or less days per week.   He is taking medications regularly.       Review of Systems         Objective           Vitals:  No vitals were obtained today due to virtual visit.    Physical Exam     Video-Visit Details    Type of service:  Video Visit     Originating Location (pt. Location): Home    Distant Location (provider location):  On-site  Platform used for Video Visit: [employer]     5.25 PM  5:41 PM

## 2024-11-17 ENCOUNTER — HEALTH MAINTENANCE LETTER (OUTPATIENT)
Age: 39
End: 2024-11-17